# Patient Record
Sex: FEMALE | Race: WHITE
[De-identification: names, ages, dates, MRNs, and addresses within clinical notes are randomized per-mention and may not be internally consistent; named-entity substitution may affect disease eponyms.]

---

## 2017-04-10 ENCOUNTER — HOSPITAL ENCOUNTER (INPATIENT)
Dept: HOSPITAL 53 - M ED | Age: 22
LOS: 4 days | Discharge: HOME | DRG: 881 | End: 2017-04-14
Attending: PSYCHIATRY & NEUROLOGY | Admitting: PSYCHIATRY & NEUROLOGY
Payer: MEDICAID

## 2017-04-10 VITALS — WEIGHT: 293 LBS | HEIGHT: 65 IN | BODY MASS INDEX: 48.82 KG/M2

## 2017-04-10 VITALS — DIASTOLIC BLOOD PRESSURE: 67 MMHG | SYSTOLIC BLOOD PRESSURE: 131 MMHG

## 2017-04-10 DIAGNOSIS — Z62.810: ICD-10-CM

## 2017-04-10 DIAGNOSIS — F60.3: ICD-10-CM

## 2017-04-10 DIAGNOSIS — E28.2: ICD-10-CM

## 2017-04-10 DIAGNOSIS — F43.10: ICD-10-CM

## 2017-04-10 DIAGNOSIS — Z62.811: ICD-10-CM

## 2017-04-10 DIAGNOSIS — F32.9: Primary | ICD-10-CM

## 2017-04-10 DIAGNOSIS — Z91.5: ICD-10-CM

## 2017-04-10 DIAGNOSIS — Z81.8: ICD-10-CM

## 2017-04-10 DIAGNOSIS — F17.210: ICD-10-CM

## 2017-04-10 DIAGNOSIS — Z91.040: ICD-10-CM

## 2017-04-10 DIAGNOSIS — M54.5: ICD-10-CM

## 2017-04-10 LAB
ALBUMIN SERPL BCG-MCNC: 4 GM/DL (ref 3.2–5.2)
ALBUMIN/GLOB SERPL: 1.18 {RATIO} (ref 1–1.93)
ALP SERPL-CCNC: 114 U/L (ref 45–117)
ALT SERPL W P-5'-P-CCNC: 23 U/L (ref 12–78)
ANION GAP SERPL CALC-SCNC: 8 MEQ/L (ref 8–16)
AST SERPL-CCNC: 19 U/L (ref 15–37)
BILIRUB CONJ SERPL-MCNC: 0.1 MG/DL (ref 0–0.2)
BILIRUB SERPL-MCNC: 0.3 MG/DL (ref 0.2–1)
BUN SERPL-MCNC: 13 MG/DL (ref 7–18)
CALCIUM SERPL-MCNC: 8.8 MG/DL (ref 8.5–10.1)
CHLORIDE SERPL-SCNC: 106 MEQ/L (ref 98–107)
CO2 SERPL-SCNC: 27 MEQ/L (ref 21–32)
CONTROL LINE HCG: (no result)
CREAT SERPL-MCNC: 0.98 MG/DL (ref 0.55–1.02)
ERYTHROCYTE [DISTWIDTH] IN BLOOD BY AUTOMATED COUNT: 13.1 % (ref 11.5–14.5)
GLUCOSE SERPL-MCNC: 90 MG/DL (ref 70–105)
MCH RBC QN AUTO: 30.4 PG (ref 27–33)
MCHC RBC AUTO-ENTMCNC: 33 G/DL (ref 32–36.5)
MCV RBC AUTO: 92 FL (ref 80–96)
METHADONE UR QL SCN: NEGATIVE
PLATELET # BLD AUTO: 319 K/MM3 (ref 150–450)
POTASSIUM SERPL-SCNC: 3.7 MEQ/L (ref 3.5–5.1)
PROT SERPL-MCNC: 7.4 GM/DL (ref 6.4–8.2)
SODIUM SERPL-SCNC: 141 MEQ/L (ref 136–145)
WBC # BLD AUTO: 9 K/MM3 (ref 4–10)

## 2017-04-11 VITALS — DIASTOLIC BLOOD PRESSURE: 74 MMHG | SYSTOLIC BLOOD PRESSURE: 110 MMHG

## 2017-04-11 VITALS — DIASTOLIC BLOOD PRESSURE: 74 MMHG | SYSTOLIC BLOOD PRESSURE: 123 MMHG

## 2017-04-11 RX ADMIN — NICOTINE SCH PATCH: 21 PATCH, EXTENDED RELEASE TRANSDERMAL at 09:00
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
## 2017-04-12 VITALS — SYSTOLIC BLOOD PRESSURE: 139 MMHG | DIASTOLIC BLOOD PRESSURE: 79 MMHG

## 2017-04-12 RX ADMIN — NICOTINE SCH PATCH: 21 PATCH, EXTENDED RELEASE TRANSDERMAL at 08:13

## 2017-04-12 NOTE — IPNPDOC
[facility] Progress Note


Progress Note


DATE OF SERVICE: 4/12/17





INTERVAL HISTORY:





Medication Side effects: None reported


Behavior: She is being interacting with other patients and staff, compliant 

with medications and attendance to groups, she hasn't been disruptive neither 

aggressive.


Group Attendance: Has been attending meditation and yoga groups


Psychiatric Symptom change: Her mood and affect have improved





VITAL SIGNS: See below.





NEW TEST RESULTS: See below





CURRENT MEDICATIONS: See below.





MENTAL STATUS EXAMINATION:





General: Dressed in personal clothes, good eye contact, good rapport


Speech: Normal


Thought processes:Linear, coherent


Thought content: Occasional suicidal ideation ,negative for delusional thoughts

, negative for auditory or visual hallucinations, negative for obsessions


Abstract reasoning, and computation: Fair


Description of associations:No loosening of associations


Description of abnormal or psychotic thoughts: No delusional thoughts, no 

hallucinations no response to internal stimuli and no thought insertion


Judgment: Improving


Insight: Improving


Orientation: Oriented 3


Recent and remote memory: Intact


Attention span and concentration: Fair


Fund of knowledge: Fair


Mood: "I feel much better, I couldn't sleep last night"


Affect: Less depressed and anxious





DIAGNOSES:


1. Major depressive disorder, chronic, severe with suicidal ideation.


2. PTSD.


3. Borderline personality disorder.


 


ASSESSMENT: The patient was in a brighter mood today, and she said that her 

sleep had improved and was happy about that because she is aware that the lack 

of sleep makes her irritable and depressed. She feels that she is doing better 

now that she has started taking her medications. She is goal oriented and she 

wants to get better.





MANAGEMENT PLAN: 





Medications: Prozac 20 mg by mouth daily and trazodone 50 mg by mouth when 

necessary daily at bedtime





Psychotherapy: She is attending groups and receiving psychotherapy





Social: Interacting with staff and other patients, once she gets stabilized she 

will be discharged and we have will be able to go back to her family.





Misc: Motivated and engaged in her treatment





Disposition: Inpatient mental health unit while she becomes stable





TIME SPENT: 15 minutes.





Vital Signs





 Vital Signs








  Date Time  Temp Pulse Resp B/P Pulse Ox O2 Delivery O2 Flow Rate FiO2


 


4/12/17 06:42 98.1 48 20 139/79    


 


4/10/17 21:05      Room Air  


 


4/10/17 21:05     98   











Current Medications





 Current Medications


Acetaminophen (Tylenol Tab) 650 mg Q6HP  PRN PO HEADACHE or DISCOMFORT;  Start 4

/10/17 at 23:45;  Stop 5/10/17 at 23:44


Al Hydrox/Mg Hydrox/Simethicone (Mylanta) 30 ml Q4HP  PRN PO HEARTBURN/

INDIGESTION;  Start 4/10/17 at 23:45;  Stop 5/10/17 at 23:44


Fluoxetine HCl (PROzac) 20 mg QAM PO  Last administered on 4/12/17at 08:15;  

Start 4/12/17 at 09:00;  Stop 5/12/17 at 08:59


Home Med (Med Rec Complete!)  ASDIRECTED XX ;  Start 4/10/17 at 20:00;  Stop 4/

10/17 at 20:00;  Status DC


Magnesium Hydroxide (Milk Of Magnesia) 30 ml DAILYPRN  PRN PO CONSTIPATION;  

Start 4/10/17 at 23:45;  Stop 5/10/17 at 23:44


Nicotine (Nicoderm Cq 21mg) 1 patch DAILY TD ;  Start 4/11/17 at 09:00;  Stop 5/ 11/17 at 08:59


Trazodone HCl (Desyrel) 50 mg QHS PO  Last administered on 4/11/17at 21:22;  

Start 4/11/17 at 21:00;  Stop 5/11/17 at 20:59





Allergies


Coded Allergies:  


     Latex (Verified  Allergy, Unknown, 4/10/17)








NADER BURGOS MD Apr 12, 2017 19:53

## 2017-04-12 NOTE — HPEPDOC
Medical History and Physical


Date of Admission


Apr 10, 2017 at 19:16





History and Physical





PCP: [facility]


ATTENDING: Dr. Cesar Candelaria





HPI: 21yoF admitted to [facility] for unspecified depressive disorder, being 

medically examined today. No acute medical complaints today. Denies any fevers, 

chills, weakness, fatigue, HA, CP, SOB, cough, palpitations, abdominal pain, N/V

/D or changes in bowel or bladder habits.





PMHx: 


Depression


ADD


Chronic back pain/degenerative disc disease


PCOS


BMI 58.3





PSHX:


Denies





SOCHX:


Resides in: [place]


Marital Status: Single


Kids: None


Employment: Unemployed


Tobacco use: One half pack per day


ETOH: One bottle of wine every 8 months


Illicit Drugs: Marijuana 1 or 2 times


IV Drug Use: Denies


Tattoos done unprofessionally: Denies 





FAMHX:


Mother: Alive, history of depression, bipolar disorder, PCOS


Father: Alive, bipolar, depression


Siblings: One sister, half brother Alive, bipolar disorder, PTSD, OCD, ADD


Children: None


Unexpected deaths due to medical reasons: None.





ROS:


As noted in HPI, otherwise 11pt ROS of systems reviewed and remarkable only for 

LMP 4/5/17


                 


PE:      


GEN: 20 yo F, appears stated age. Well-nourished, well developed. No acute 

distress. Alert and oriented x 3. Pleasant, interactive. 


HEENT: Normocephalic, atraumatic. Pupils are equal, round, and reactive to 

light. Extraocular movements are intact. No nystagmus appreciated. Sclera are 

nonicteric. Conjunctiva without injection. Nose midline. Nasal turbinates 

without bogginess. EACs both patent BL. TMs both visualized and gray with good 

cone of light, no bulging or erythema. No facial asymmetry. Moist mucous 

membranes. Dentition fair. Pharynx pink and moist, no cobblestoning. Neck supple

, trachea midline. No lymphadenopathy or thyromegaly appreciated. 


CHEST: Regular rate and rhythm, +S1, +S2


LUNGS: Clear to auscultation bilaterally. No wheezes, rales, or rhonchi. 

Breathing appears symmetric and easy. Patient is speaking in full sentences. No 

accessory muscle use.


ABD: Round, soft, non-tender, non-distended. +Bowel sounds throughout. No 

rebound or guarding. No costovertebral angle tenderness.


EXT: Pulses 2+ bilaterally dorsalis pedis and radial. No lower extremity edema 

appreciated.


SKIN: Pink, dry, warm. Capillary refill <2sec. No rashes.


NEURO: Alert and oriented x 3. Cranial nerves III-XII are intact. No focal 

deficits appreciated. 





EKG: Pending.








A&P:  21yoF admitted to [facility] for unspecified depressive disorder


1. Psych. Plan per Psychiatry. Obtain baseline EKG to assure the safety of 

psychiatric medications as they can prolong the QT interval.


2. Nicotine dependence. Patch available.


3. Chronic low back pain. Tylenol as needed.


4. Follow up with PCP on discharge.


5. Staff member Noy NOE present throughout exam.





Vital Signs





 Vital Signs








  Date Time  Temp Pulse Resp B/P Pulse Ox O2 Delivery O2 Flow Rate FiO2


 


4/12/17 06:42 98.1 48 20 139/79    


 


4/10/17 21:05      Room Air  


 


4/10/17 21:05     98   











Laboratory Data


Labs 24H











Item Value  Date Time


 


White Blood Count 9.0 K/mm3 4/10/17 1601


 


Red Blood Count 4.47 M/mm3 4/10/17 1601


 


Hemoglobin 13.6 g/dl 4/10/17 1601


 


Hematocrit 41.1 % 4/10/17 1601


 


Mean Corpuscular Volume 92.0 fl 4/10/17 1601


 


Mean Corpuscular Hemoglobin 30.4 pg 4/10/17 1601


 


Mean Corpuscular Hemoglobin Concent 33.0 g/dl 4/10/17 1601


 


Red Cell Distribution Width 13.1 % 4/10/17 1601


 


Platelet Count 319 k/mm3 4/10/17 1601


 


Sodium Level 141 MEQ/L 4/10/17 1601


 


Potassium Level 3.7 MEQ/L 4/10/17 1601


 


Chloride Level 106 MEQ/L 4/10/17 1601


 


Carbon Dioxide Level 27 MEQ/L 4/10/17 1601


 


Anion Gap 8 MEQ/L 4/10/17 1601


 


Blood Urea Nitrogen 13 MG/DL 4/10/17 1601


 


Creatinine 0.98 MG/DL 4/10/17 1601


 


Fasting Glucose 90 MG/DL 4/10/17 1601


 


Calcium Level 8.8 MG/DL 4/10/17 1601


 


Total Bilirubin 0.3 MG/DL 4/10/17 1601


 


Direct Bilirubin 0.1 MG/DL 4/10/17 1601


 


Aspartate Amino Transf (AST/SGOT) 19 U/L 4/10/17 1601


 


Alanine Aminotransferase (ALT/SGPT) 23 U/L 4/10/17 1601


 


Alkaline Phosphatase 114 U/L 4/10/17 1601


 


Total Protein 7.4 GM/DL 4/10/17 1601


 


Albumin 4.0 GM/DL 4/10/17 1601


 


Albumin/Globulin Ratio 1.18 4/10/17 1601


 


Thyroid Stimulating Hormone (TSH) 2.590 uIU/ML 4/10/17 1601


 


Human Chorionic Gonadotropin, Qual NEGATIVE 4/10/17 1601


 


Salicylates Level 2.3 MG/DL L 4/10/17 1601


 


Urine Opiates Screen NEGATIVE 4/10/17 1601


 


Urine Methadone Screen NEGATIVE 4/10/17 1601


 


Acetaminophen Level < 2.0 UG/ML L 4/10/17 1601


 


Urine Barbiturates Screen NEGATIVE 4/10/17 1601


 


Urine Phencyclidine Screen NEGATIVE 4/10/17 1601


 


Urine Amphetamines Screen NEGATIVE 4/10/17 1601


 


Urine Benzodiazepines Screen NEGATIVE 4/10/17 1601


 


Urine Cocaine Metabolite Screen NEGATIVE 4/10/17 1601


 


Urine Cannabinoids Screen POSITIVE H 4/10/17 1601


 


Ethyl Alcohol Level < 0.003 % 4/10/17 1601











Home Medications


No Active Prescriptions or Reported Meds





Allergies


Coded Allergies:  


     Latex (Verified  Allergy, Unknown, 4/10/17)








Jennifer Langley Apr 12, 2017 10:54

## 2017-04-13 VITALS — SYSTOLIC BLOOD PRESSURE: 146 MMHG | DIASTOLIC BLOOD PRESSURE: 88 MMHG

## 2017-04-13 VITALS — SYSTOLIC BLOOD PRESSURE: 134 MMHG | DIASTOLIC BLOOD PRESSURE: 76 MMHG

## 2017-04-13 RX ADMIN — NICOTINE SCH PATCH: 21 PATCH, EXTENDED RELEASE TRANSDERMAL at 08:07

## 2017-04-13 NOTE — ECGEPIP
Stationary ECG Study

                              [facility]

                                       

                                       Test Date:    2017

Pat Name:     SALLY SANDS              Department:   

Patient ID:   [id number]                 Room:         [place] [address]

Gender:       F                        Technician:   SHABBIR

:          1995               Requested By: Jennifer Langley 

Order Number: [id number]     Reading MD:   Fredis Arguello

                                 Measurements

Intervals                              Axis          

Rate:         60                       P:            23

TX:           137                      QRS:          61

QRSD:         98                       T:            36

QT:           389                                    

QTc:          390                                    

                           Interpretive Statements

SINUS RHYTHM

Normal

Electronically Signed On 2017 12:44:28 EDT by Fredis Arguello

## 2017-04-13 NOTE — IPNPDOC
[facility] Progress Note


Progress Note


INTERVAL HISTORY:





Medication Side effects: No negative side effects have been reported


Behavior: Good attitude, cooperative, friendly, sociable


Group Attendance: Has been attending groups regularly


Psychiatric Symptom change: Her mood and affect are brighter, less depressed. 

She has been able to sleep 8-9 hours.





VITAL SIGNS: See below.





NEW TEST RESULTS: See below





CURRENT MEDICATIONS: See below.





MENTAL STATUS EXAMINATION:





General: Alert, cooperative, good eye contact, good rapport, good hygiene


Speech: Normal, not circumstantial, no tangential


Thought processes: Normal, linear, coherent


Thought content: Negative for suicidal ideation, homicidal ideation, delusional 

thoughts, hallucinations, obsessions


Abstract reasoning, and computation: Fair


Description of associations: No loosening of associations


Description of abnormal or psychotic thoughts: Absent


Judgment: Improving


Insight: Improving


Orientation: Oriented 3


Recent and remote memory: Intact


Attention span and concentration: Fair


Fund of knowledge: Fair


Mood: I feel much better and I was able to sleep


Affect: Less depressed and anxious





DIAGNOSES:


1. Major depressive disorder, moderate without suicidal ideation.


2. Posttraumatic stress disorder.


3. And borderline personality.


 


ASSESSMENT: The patient is motivated and she has improved since her admission. 

She is goal oriented and has plans for the future. She will be able to be 

discharged home by the end of the week.





MANAGEMENT PLAN: 





Medications: Prozac 20 mg by mouth every morning and trazodone 50 mg by mouth 

daily at bedtime





Psychotherapy: Accidents groups regularly





Social: She will live with her family and will be in touch with friends.





Misc: None





Disposition: Patient will be discharged home to her family if they're able to 

have a meeting with the discharge planners and they have. Patient is stable, 

not suicidal, not psychotic, not homicidal.





TIME SPENT: 15 minutes.DATE OF SERVICE: 4/13/1





VITAL SIGNS: See below.





Vital Signs





 Vital Signs








  Date Time  Temp Pulse Resp B/P Pulse Ox O2 Delivery O2 Flow Rate FiO2


 


4/13/17 06:32 98.2 98 18 134/76  Room Air  


 


4/10/17 21:05     98   











Current Medications





 Current Medications


Acetaminophen (Tylenol Tab) 650 mg Q6HP  PRN PO HEADACHE or DISCOMFORT;  Start 4

/10/17 at 23:45;  Stop 5/10/17 at 23:44


Al Hydrox/Mg Hydrox/Simethicone (Mylanta) 30 ml Q4HP  PRN PO HEARTBURN/

INDIGESTION;  Start 4/10/17 at 23:45;  Stop 5/10/17 at 23:44


Fluoxetine HCl (PROzac) 20 mg QAM PO  Last administered on 4/13/17at 08:07;  

Start 4/12/17 at 09:00;  Stop 5/12/17 at 08:59


Home Med (Med Rec Complete!)  ASDIRECTED XX ;  Start 4/10/17 at 20:00;  Stop 4/

10/17 at 20:00;  Status DC


Magnesium Hydroxide (Milk Of Magnesia) 30 ml DAILYPRN  PRN PO CONSTIPATION;  

Start 4/10/17 at 23:45;  Stop 5/10/17 at 23:44


Nicotine (Nicoderm Cq 21mg) 1 patch DAILY TD ;  Start 4/11/17 at 09:00;  Stop 5/ 11/17 at 08:59


Trazodone HCl (Desyrel) 50 mg QHS PO  Last administered on 4/12/17at 21:38;  

Start 4/11/17 at 21:00;  Stop 5/11/17 at 20:59





Allergies


Coded Allergies:  


     Latex (Verified  Allergy, Unknown, 4/10/17)








NADER BURGOS MD Apr 13, 2017 16:49

## 2017-04-14 VITALS — DIASTOLIC BLOOD PRESSURE: 69 MMHG | SYSTOLIC BLOOD PRESSURE: 134 MMHG

## 2017-04-14 RX ADMIN — NICOTINE SCH PATCH: 21 PATCH, EXTENDED RELEASE TRANSDERMAL at 08:07

## 2017-04-14 NOTE — DS.PDOC
[facility] Discharge Summary


Discharge Summary


DATE OF ADMISSION: Apr 10, 2017 at 19:16 


DATE OF DISCHARGE: 





DISCHARGE DIAGNOSES:


1. Major depressive disorder unspecified, without suicidal ideation


2. PTSD


3. Borderline personality disorder.





REASON FOR ADMISSION: The patient states that she has been out of medication 

for 4 months and that she felt increasingly depressed, unable to sleep well, sad

, tearful, with low levels of energy, lack of interest, anhedonia difficulties 

concentrating, psychomotor retardation and suicidal ideation. She was at risk 

for suicide, she has tried to kill herself before in the last time she tried to 

jump off a bridge, she stated this was the thought that was on her mind, 

jumping off a bridge if she continued to feel very depressed..





CONSULTANTS INVOLVED: None





TREATMENT AND PROGRESS ON THE UNIT :, Has been treated with 20 mg of fluoxetine 

every day since she was admitted for depression and she has been taking 

trazodone 50 mg at bedtime for insomnia. She has improved recently on those 

medications and now she states that she can sleep better and feels better, 

because she feels calmer, has more energy, has more interest in mingling with 

people, socializing and she feels that her mind is getting clear and she is 

able to focus and concentrate better. She has denies suicidal ideation and has 

denied having thoughts of hurting herself in any other possible weight, like 

cutting.





HOSPITAL COURSE: She has responded to medications fairly well, she has attended 

groups especially the medication and a yoga group but she also has attended 

process groups. She is very interested and motivated in getting better and 

overcoming her psychiatric problems. She states that she wants to go back to 

school, she is goal oriented and has plans for her future. She has been seen in 

a brighter mood, she is more sociable and she is able to verbalize quite 

clearly her emotions. 





DISCHARGE ASSESSMENT: She is stable upon discharge, she is not at risk for 

suicide or harming herself in any other possible way. She has no delusions, no 

hallucinations no obsessions and no aggressiveness towards others. 





MENTAL STATUS EXAMINATION ON DISCHARGE: 


Patient is a 21-year old female, who is alert, cooperative with interview, with 

good eye contact and will report.


Speech is normal.


Language skills are fair.


Thought processes including: Linear, coherent, logical. 


Thought content: Negative for suicidal ideation, negative for homicidal ideation

, negative for delusional thoughts, hallucinations and obsessions. She doesn't 

have a suicidal plan not homicidal at the time of the discharge.


Abstract reasoning, and computation: Fairly good.


Description of associations: There is no loosening of associations.


Description of abnormal or psychotic thoughts: There is no psychotic thoughts 

present.


Judgment: Improved.


Insight: Improved.


Orientation to oriented 3.


Recent and remote memory: Intact.


Attention span and concentration: Good.


Language: Fluid, well structured with fairly good vocabulary.


Fund of knowledge: Good.


Mood: "I feel much better, I've been able to sleep and I feel that I'm less 

depressed".


Affect: Brighter.





MEDICATIONS ON DISCHARGE:


-Prozac 20 mg for depression and anxiety.


-Trazodone 50 mg for insomnia








PLAN/FOLLOWUP ARRANGEMENTS: The patient will go back to her family where she 

will be taken to an outpatient treatment center where she will continue to 

receive psychotherapy and psychiatric medications.





The amount of time spent in the coordination of care for this patient was 

approximately 40 minutes





Vital Signs/I&Os





 Vital Signs








  Date Time  Temp Pulse Resp B/P Pulse Ox O2 Delivery O2 Flow Rate FiO2


 


4/14/17 05:55 97.2 70 18 134/69  Room Air  


 


4/10/17 21:05     98   











Medications


Scheduled


Fluoxetine Hcl (Fluoxetine HCl) 20 Mg Cap #10 20 MG PO QAM MOOD 


Trazodone HCl (Trazodone HCl) 50 Mg Tab #10 50 MG PO QHS ANXIETY/AGITATION 





Allergies


Coded Allergies:  


     Latex (Verified  Allergy, Unknown, 4/10/17)








NADER BURGOS MD Apr 14, 2017 09:47

## 2018-02-02 ENCOUNTER — HOSPITAL ENCOUNTER (OUTPATIENT)
Dept: HOSPITAL 53 - M SMT | Age: 23
End: 2018-02-02
Payer: COMMERCIAL

## 2018-02-02 DIAGNOSIS — Z34.82: Primary | ICD-10-CM

## 2018-02-02 LAB
BASO #: 0.1 10^3/UL (ref 0–0.2)
BASO %: 0.5 % (ref 0–1)
CHLAMYDIA DNA AMPLIFICATION: NEGATIVE
EOS #: 0.1 10^3/UL (ref 0–0.5)
EOSINOPHIL NFR BLD AUTO: 1.2 % (ref 0–3)
GC DNA AMPLIFICATION: NEGATIVE
HBSAG PRENATAL: NEGATIVE
HCV AB SER QL: < 0 INDEX (ref ?–0.8)
HEMATOCRIT: 42.4 % (ref 36–47)
HEMOGLOBIN: 14.3 G/DL (ref 12–16)
HIV 1&2 SCREEN CENTAUR: NEGATIVE
IMMATURE GRANULOCYTE #: 0.1 10^3/UL (ref 0–0)
IMMATURE GRANULOCYTE %: 0.4 % (ref 0–0)
LYMPH #: 2.4 10^3/UL (ref 1.5–6.5)
LYMPH %: 19.7 % (ref 24–44)
MEAN CORPUSCULAR HEMOGLOBIN: 29.9 PG (ref 27–33)
MEAN CORPUSCULAR HGB CONC: 33.7 G/DL (ref 32–36.5)
MEAN CORPUSCULAR VOLUME: 88.7 FL (ref 80–96)
MONO #: 0.8 10^3/UL (ref 0–0.8)
MONO %: 6.6 % (ref 0–5)
NEUTROPHILS #: 8.6 10^3/UL (ref 1.8–7.7)
NEUTROPHILS %: 71.6 % (ref 36–66)
NRBC BLD AUTO-RTO: 0 % (ref 0–0)
PLATELET COUNT, AUTOMATED: 338 10^3/UL (ref 150–450)
RED BLOOD COUNT: 4.78 10^6/UL (ref 4–5.4)
RED CELL DISTRIBUTION WIDTH: 13.4 % (ref 11.5–14.5)
RUBELLA IGG QUALITATIVE: (no result)
SYPHILIS: NONREACTIVE
WHITE BLOOD COUNT: 12 10^3/UL (ref 4–10)

## 2018-02-02 PROCEDURE — 86762 RUBELLA ANTIBODY: CPT

## 2018-03-13 ENCOUNTER — HOSPITAL ENCOUNTER (OUTPATIENT)
Dept: HOSPITAL 53 - M SMT | Age: 23
End: 2018-03-13
Attending: ADVANCED PRACTICE MIDWIFE
Payer: MEDICAID

## 2018-03-13 DIAGNOSIS — R53.83: Primary | ICD-10-CM

## 2018-03-13 LAB
ALBUMIN/GLOBULIN RATIO: 0.94 (ref 1–1.93)
ALBUMIN: 3.1 GM/DL (ref 3.2–5.2)
ALKALINE PHOSPHATASE: 98 U/L (ref 45–117)
ALT SERPL W P-5'-P-CCNC: 61 U/L (ref 12–78)
ANION GAP: 9 MEQ/L (ref 8–16)
AST SERPL-CCNC: 31 U/L (ref 7–37)
BILIRUBIN,TOTAL: 0.2 MG/DL (ref 0.2–1)
BLOOD UREA NITROGEN: 8 MG/DL (ref 7–18)
CALCIUM LEVEL: 8.3 MG/DL (ref 8.5–10.1)
CARBON DIOXIDE LEVEL: 25 MEQ/L (ref 21–32)
CHLAMYDIA DNA AMPLIFICATION: NEGATIVE
CHLORIDE LEVEL: 107 MEQ/L (ref 98–107)
CREATININE FOR GFR: 0.63 MG/DL (ref 0.55–1.3)
FT4I SERPL CALC-MCNC: 4.2 % (ref 1.3–4.8)
GC DNA AMPLIFICATION: NEGATIVE
GLUCOSE, FASTING: 68 MG/DL (ref 70–100)
HEMATOCRIT: 37.3 % (ref 36–47)
HEMOGLOBIN: 12.4 G/DL (ref 12–16)
MEAN CORPUSCULAR HEMOGLOBIN: 30 PG (ref 27–33)
MEAN CORPUSCULAR HGB CONC: 33.2 G/DL (ref 32–36.5)
MEAN CORPUSCULAR VOLUME: 90.1 FL (ref 80–96)
NRBC BLD AUTO-RTO: 0 % (ref 0–0)
PLATELET COUNT, AUTOMATED: 323 10^3/UL (ref 150–450)
POTASSIUM SERUM: 3.9 MEQ/L (ref 3.5–5.1)
RED BLOOD COUNT: 4.14 10^6/UL (ref 4–5.4)
RED CELL DISTRIBUTION WIDTH: 14.2 % (ref 11.5–14.5)
SODIUM LEVEL: 141 MEQ/L (ref 136–145)
T UPTAKE: 25 % (ref 30–39)
THYROID STIMULATING HORMONE: 1.54 UIU/ML (ref 0.46–3.98)
THYROXINE (T4): 16.6 UG/DL (ref 6–11.6)
TOTAL PROTEIN: 6.4 GM/DL (ref 6.4–8.2)
WHITE BLOOD COUNT: 9.9 10^3/UL (ref 4–10)

## 2018-03-13 PROCEDURE — 84443 ASSAY THYROID STIM HORMONE: CPT

## 2018-05-05 ENCOUNTER — HOSPITAL ENCOUNTER (OUTPATIENT)
Dept: HOSPITAL 53 - M LDO | Age: 23
Discharge: HOME | End: 2018-05-05
Attending: ADVANCED PRACTICE MIDWIFE
Payer: COMMERCIAL

## 2018-05-05 DIAGNOSIS — O47.03: Primary | ICD-10-CM

## 2018-05-05 DIAGNOSIS — Z3A.27: ICD-10-CM

## 2018-05-05 PROCEDURE — 59025 FETAL NON-STRESS TEST: CPT

## 2018-05-15 ENCOUNTER — HOSPITAL ENCOUNTER (OUTPATIENT)
Dept: HOSPITAL 53 - M SMT | Age: 23
End: 2018-05-15
Attending: ADVANCED PRACTICE MIDWIFE
Payer: COMMERCIAL

## 2018-05-15 DIAGNOSIS — Z34.82: Primary | ICD-10-CM

## 2018-05-15 LAB
GLUCOSE CHALLENGE TEST 1 HOUR: 96 MG/DL (ref ?–140)
HEMATOCRIT: 34.8 % (ref 36–47)
HEMOGLOBIN: 11.3 G/DL (ref 12–15.5)
MEAN CORPUSCULAR HEMOGLOBIN: 30.4 PG (ref 27–33)
MEAN CORPUSCULAR HGB CONC: 32.5 G/DL (ref 32–36.5)
MEAN CORPUSCULAR VOLUME: 93.5 FL (ref 80–96)
NRBC BLD AUTO-RTO: 0 % (ref 0–0)
PLATELET COUNT, AUTOMATED: 302 10^3/UL (ref 150–450)
RED BLOOD COUNT: 3.72 10^6/UL (ref 4–5.4)
RED CELL DISTRIBUTION WIDTH: 13.6 % (ref 11.5–14.5)
WHITE BLOOD COUNT: 14.5 10^3/UL (ref 4–10)

## 2018-05-15 PROCEDURE — 82950 GLUCOSE TEST: CPT

## 2018-05-23 ENCOUNTER — HOSPITAL ENCOUNTER (OUTPATIENT)
Dept: HOSPITAL 53 - M LDO | Age: 23
Discharge: HOME | End: 2018-05-23
Attending: OBSTETRICS & GYNECOLOGY
Payer: COMMERCIAL

## 2018-05-23 DIAGNOSIS — Z3A.30: ICD-10-CM

## 2018-05-23 DIAGNOSIS — O47.03: Primary | ICD-10-CM

## 2018-05-23 PROCEDURE — 76815 OB US LIMITED FETUS(S): CPT

## 2018-07-24 ENCOUNTER — HOSPITAL ENCOUNTER (OUTPATIENT)
Dept: HOSPITAL 53 - M LAB REF | Age: 23
End: 2018-07-24
Payer: COMMERCIAL

## 2018-07-24 DIAGNOSIS — Z34.83: Primary | ICD-10-CM

## 2018-07-24 DIAGNOSIS — Z3A.00: ICD-10-CM

## 2018-07-24 PROCEDURE — 87081 CULTURE SCREEN ONLY: CPT

## 2018-08-11 ENCOUNTER — HOSPITAL ENCOUNTER (INPATIENT)
Dept: HOSPITAL 53 - M LDO | Age: 23
LOS: 3 days | Discharge: HOME | DRG: 560 | End: 2018-08-14
Payer: COMMERCIAL

## 2018-08-11 DIAGNOSIS — F90.9: ICD-10-CM

## 2018-08-11 DIAGNOSIS — F17.210: ICD-10-CM

## 2018-08-11 DIAGNOSIS — F32.9: ICD-10-CM

## 2018-08-11 DIAGNOSIS — F43.10: ICD-10-CM

## 2018-08-11 DIAGNOSIS — Z91.040: ICD-10-CM

## 2018-08-11 DIAGNOSIS — O48.0: Primary | ICD-10-CM

## 2018-08-11 DIAGNOSIS — F42.9: ICD-10-CM

## 2018-08-11 DIAGNOSIS — F41.9: ICD-10-CM

## 2018-08-11 DIAGNOSIS — Z3A.41: ICD-10-CM

## 2018-08-11 DIAGNOSIS — F52.8: ICD-10-CM

## 2018-08-11 LAB
AMPHETAMINES UR QL SCN: NEGATIVE
BARBITURATES UR QL SCN: NEGATIVE
BENZODIAZ UR QL SCN: NEGATIVE
BZE UR QL SCN: NEGATIVE
CANNABINOIDS UR QL SCN: NEGATIVE
HEMATOCRIT: 34.4 % (ref 36–47)
HEMOGLOBIN: 11.5 G/DL (ref 12–15.5)
MEAN CORPUSCULAR HEMOGLOBIN: 29.4 PG (ref 27–33)
MEAN CORPUSCULAR HGB CONC: 33.4 G/DL (ref 32–36.5)
MEAN CORPUSCULAR VOLUME: 88 FL (ref 80–96)
METHADONE URINE REFLEX: NEGATIVE
NRBC BLD AUTO-RTO: 0 % (ref 0–0)
OPIATES UR QL SCN: NEGATIVE
PCP UR QL SCN: NEGATIVE
PLATELET COUNT, AUTOMATED: 302 10^3/UL (ref 150–450)
RED BLOOD COUNT: 3.91 10^6/UL (ref 4–5.4)
RED CELL DISTRIBUTION WIDTH: 14.6 % (ref 11.5–14.5)
WHITE BLOOD COUNT: 11 10^3/UL (ref 4–10)

## 2018-08-11 RX ADMIN — SODIUM CHLORIDE, POTASSIUM CHLORIDE, SODIUM LACTATE AND CALCIUM CHLORIDE 1 MLS/HR: 600; 310; 30; 20 INJECTION, SOLUTION INTRAVENOUS at 20:05

## 2018-08-11 RX ADMIN — Medication 1 MLS/HR: at 21:17

## 2018-08-12 PROCEDURE — 0KQM0ZZ REPAIR PERINEUM MUSCLE, OPEN APPROACH: ICD-10-PCS

## 2018-08-12 RX ADMIN — SODIUM CHLORIDE, POTASSIUM CHLORIDE, SODIUM LACTATE AND CALCIUM CHLORIDE 1 MLS/HR: 600; 310; 30; 20 INJECTION, SOLUTION INTRAVENOUS at 10:30

## 2018-08-12 RX ADMIN — BUTORPHANOL TARTRATE 1 MG: 2 INJECTION, SOLUTION INTRAMUSCULAR; INTRAVENOUS at 01:06

## 2018-08-12 RX ADMIN — PROMETHAZINE HYDROCHLORIDE 1 MG: 25 INJECTION INTRAMUSCULAR; INTRAVENOUS at 01:07

## 2018-08-12 RX ADMIN — LIDOCAINE HYDROCHLORIDE 1 ML: 10 INJECTION, SOLUTION INFILTRATION; PERINEURAL at 12:00

## 2018-08-12 RX ADMIN — Medication 1 MLS/HR: at 12:00

## 2018-08-13 LAB — SYPHILIS: NONREACTIVE

## 2018-08-13 RX ADMIN — IBUPROFEN 1 MG: 800 TABLET, FILM COATED ORAL at 04:14

## 2018-08-13 RX ADMIN — Medication 1 TAB: at 07:28

## 2018-08-13 RX ADMIN — HUMAN RHO(D) IMMUNE GLOBULIN 1 MCG: 300 INJECTION, SOLUTION INTRAMUSCULAR at 10:21

## 2018-08-13 RX ADMIN — MEASLES, MUMPS, AND RUBELLA VIRUS VACCINE LIVE 1 ML: 1000; 12500; 1000 INJECTION, POWDER, LYOPHILIZED, FOR SUSPENSION SUBCUTANEOUS at 10:21

## 2018-08-14 RX ADMIN — Medication 1 TAB: at 08:15

## 2018-08-16 ENCOUNTER — HOSPITAL ENCOUNTER (EMERGENCY)
Dept: HOSPITAL 53 - M ED | Age: 23
LOS: 1 days | Discharge: HOME | End: 2018-08-17
Payer: COMMERCIAL

## 2018-08-16 DIAGNOSIS — K21.9: ICD-10-CM

## 2018-08-16 DIAGNOSIS — F31.9: ICD-10-CM

## 2018-08-16 DIAGNOSIS — F90.9: ICD-10-CM

## 2018-08-16 DIAGNOSIS — F52.8: ICD-10-CM

## 2018-08-16 DIAGNOSIS — Z91.018: ICD-10-CM

## 2018-08-16 DIAGNOSIS — F17.200: ICD-10-CM

## 2018-08-16 DIAGNOSIS — Z91.040: ICD-10-CM

## 2018-08-16 DIAGNOSIS — F42.9: ICD-10-CM

## 2018-08-16 LAB
ALT SERPL W P-5'-P-CCNC: 29 U/L (ref 12–78)
ANION GAP: 8 MEQ/L (ref 8–16)
AST SERPL-CCNC: 26 U/L (ref 7–37)
BASO #: 0.1 10^3/UL (ref 0–0.2)
BASO %: 0.3 % (ref 0–1)
BILIRUBIN,TOTAL: 0.2 MG/DL (ref 0.2–1)
BLOOD UREA NITROGEN: 12 MG/DL (ref 7–18)
CALCIUM LEVEL: 8.4 MG/DL (ref 8.5–10.1)
CARBON DIOXIDE LEVEL: 27 MEQ/L (ref 21–32)
CHLORIDE LEVEL: 110 MEQ/L (ref 98–107)
CREATININE FOR GFR: 0.89 MG/DL (ref 0.55–1.3)
CREATININE,RANDOM URINE: 174 MG/DL
EOS #: 0.4 10^3/UL (ref 0–0.5)
EOSINOPHIL NFR BLD AUTO: 2.4 % (ref 0–3)
GLUCOSE, FASTING: 89 MG/DL (ref 70–100)
HEMATOCRIT: 26.4 % (ref 36–47)
HEMOGLOBIN: 8.5 G/DL (ref 12–15.5)
IMMATURE GRANULOCYTE %: 0.7 % (ref 0–3)
LACTIC ACID SEPSIS PROTOCOL: 1.6 MMOL/L (ref 0.4–2)
LDH SERPL L TO P-CCNC: 266 U/L (ref 84–246)
LEUKOCYTE ESTERASE UR AUTO RFX: (no result)
LYMPH #: 2.2 10^3/UL (ref 1.5–6.5)
LYMPH %: 13.7 % (ref 24–44)
MEAN CORPUSCULAR HEMOGLOBIN: 29.7 PG (ref 27–33)
MEAN CORPUSCULAR HGB CONC: 32.2 G/DL (ref 32–36.5)
MEAN CORPUSCULAR VOLUME: 92.3 FL (ref 80–96)
MONO #: 0.8 10^3/UL (ref 0–0.8)
MONO %: 4.7 % (ref 0–5)
NEUTROPHILS #: 12.8 10^3/UL (ref 1.8–7.7)
NEUTROPHILS %: 78.2 % (ref 36–66)
NRBC BLD AUTO-RTO: 0.1 % (ref 0–0)
PLATELET COUNT, AUTOMATED: 395 10^3/UL (ref 150–450)
POTASSIUM SERUM: 4.6 MEQ/L (ref 3.5–5.1)
RED BLOOD COUNT: 2.86 10^6/UL (ref 4–5.4)
RED CELL DISTRIBUTION WIDTH: 14.9 % (ref 11.5–14.5)
SODIUM LEVEL: 145 MEQ/L (ref 136–145)
SPECIFIC GRAVITY UR AUTO RFX: 1.02 (ref 1–1.03)
SQUAM EPITHELIAL CELL UR AURFX: 0 /HPF (ref 0–6)
TOTAL PROTEIN,RANDOM URINE: 14.8 MG/DL (ref 0–12)
URIC ACID: 8.1 MG/DL (ref 2.6–6)
WHITE BLOOD COUNT: 16.3 10^3/UL (ref 4–10)

## 2018-08-16 PROCEDURE — 76856 US EXAM PELVIC COMPLETE: CPT

## 2018-08-16 RX ADMIN — ACETAMINOPHEN 1 MG: 325 TABLET ORAL at 20:10

## 2018-08-16 RX ADMIN — IBUPROFEN 1 MG: 800 TABLET, FILM COATED ORAL at 20:10

## 2018-08-16 RX ADMIN — NITROFURANTOIN (MONOHYDRATE/MACROCRYSTALS) 1 MG: 75; 25 CAPSULE ORAL at 23:30

## 2018-08-16 RX ADMIN — SODIUM CHLORIDE 1 MLS/HR: 9 INJECTION, SOLUTION INTRAVENOUS at 20:45

## 2018-08-16 RX ADMIN — SODIUM CHLORIDE 1 MLS/HR: 9 INJECTION, SOLUTION INTRAVENOUS at 20:10

## 2019-10-16 ENCOUNTER — HOSPITAL ENCOUNTER (EMERGENCY)
Dept: HOSPITAL 53 - M ED | Age: 24
LOS: 1 days | Discharge: HOME | End: 2019-10-17
Payer: COMMERCIAL

## 2019-10-16 VITALS — HEIGHT: 66 IN | WEIGHT: 265.55 LBS | BODY MASS INDEX: 42.68 KG/M2

## 2019-10-16 DIAGNOSIS — Z91.040: ICD-10-CM

## 2019-10-16 DIAGNOSIS — K21.9: ICD-10-CM

## 2019-10-16 DIAGNOSIS — N83.202: Primary | ICD-10-CM

## 2019-10-16 DIAGNOSIS — Z91.018: ICD-10-CM

## 2019-10-16 LAB
ALBUMIN SERPL BCG-MCNC: 3.7 GM/DL (ref 3.2–5.2)
ALT SERPL W P-5'-P-CCNC: 24 U/L (ref 12–78)
BASOPHILS # BLD AUTO: 0.1 10^3/UL (ref 0–0.2)
BASOPHILS NFR BLD AUTO: 0.8 % (ref 0–1)
BILIRUB CONJ SERPL-MCNC: 0.1 MG/DL (ref 0–0.2)
BILIRUB SERPL-MCNC: 0.6 MG/DL (ref 0.2–1)
BUN SERPL-MCNC: 14 MG/DL (ref 7–18)
CALCIUM SERPL-MCNC: 8.8 MG/DL (ref 8.5–10.1)
CHLORIDE SERPL-SCNC: 108 MEQ/L (ref 98–107)
CO2 SERPL-SCNC: 26 MEQ/L (ref 21–32)
CREAT SERPL-MCNC: 0.92 MG/DL (ref 0.55–1.3)
EOSINOPHIL # BLD AUTO: 0.3 10^3/UL (ref 0–0.5)
EOSINOPHIL NFR BLD AUTO: 3.9 % (ref 0–3)
GFR SERPL CREATININE-BSD FRML MDRD: > 60 ML/MIN/{1.73_M2} (ref 60–?)
GLUCOSE SERPL-MCNC: 78 MG/DL (ref 70–100)
HCT VFR BLD AUTO: 42.4 % (ref 36–47)
HGB BLD-MCNC: 13.7 G/DL (ref 12–15.5)
LIPASE SERPL-CCNC: 78 U/L (ref 73–393)
LYMPHOCYTES # BLD AUTO: 2.5 10^3/UL (ref 1.5–5)
LYMPHOCYTES NFR BLD AUTO: 35.3 % (ref 24–44)
MCH RBC QN AUTO: 28.4 PG (ref 27–33)
MCHC RBC AUTO-ENTMCNC: 32.3 G/DL (ref 32–36.5)
MCV RBC AUTO: 87.8 FL (ref 80–96)
MONOCYTES # BLD AUTO: 0.5 10^3/UL (ref 0–0.8)
MONOCYTES NFR BLD AUTO: 7.2 % (ref 0–5)
NEUTROPHILS # BLD AUTO: 3.8 10^3/UL (ref 1.5–8.5)
NEUTROPHILS NFR BLD AUTO: 52.5 % (ref 36–66)
PLATELET # BLD AUTO: 319 10^3/UL (ref 150–450)
POTASSIUM SERPL-SCNC: 3.9 MEQ/L (ref 3.5–5.1)
PROT SERPL-MCNC: 7.1 GM/DL (ref 6.4–8.2)
RBC # BLD AUTO: 4.83 10^6/UL (ref 4–5.4)
SODIUM SERPL-SCNC: 138 MEQ/L (ref 136–145)
WBC # BLD AUTO: 7.2 10^3/UL (ref 4–10)

## 2019-10-16 NOTE — REPVR
PROCEDURE INFORMATION: 

Exam: US Pelvis Complete, Transabdominal 

Exam date and time: 10/16/2019 10:48 PM 

Clinical history: 24 years old, female; Pelvic pain; Additional info: Lower abd 

pain, suprapubic pain 



TECHNIQUE: 

Imaging protocol: Real-time transabdominal pelvic ultrasound with image 

documentation. Complete exam. 



COMPARISON: 

US PELVIC NON-OB COMPLETE 8/16/2018 7:47 PM 



FINDINGS: 

Uterus/cervix: The uterus measures 8.7 CM in length x 4.2 CM in AP dimension by 

5.2 CM in transverse dimension and anteverted. There is an IUD within the 

endometrium. 

Right adnexa: The right ovary measures 2.7 CM in length by 1.6 CM in thickness 

and there is vascular flow of the right ovary with no evidence of torsion. 

Left adnexa: The left ovary measures 2.7 CM in length by 1.6 CM in thickness 

and there is vascular flow with no evidence of torsion. There is a small cyst 

at the left ovary. 

Free fluid: There is no evidence of free fluid within the pelvis. 

Bladder: Normal appearing urinary bladder. 



IMPRESSION: 

Small cyst left ovary. No evidence of torsion right or left ovary. 



Electronically signed by: Romero Johnson On 10/16/2019  23:08:51 PM

## 2019-10-17 VITALS — DIASTOLIC BLOOD PRESSURE: 68 MMHG | SYSTOLIC BLOOD PRESSURE: 129 MMHG

## 2020-05-08 NOTE — REP
OB ULTRASOUND:

 

Real-time sonographic evaluation of the gravid uterus performed.

 

There is a single living intrauterine gestation with an estimated gestational age

20 weeks 3 days with EDC 09/22/2020.  Today's measurements indicate appropriate

growth.

 

Biometry and Growth:

 

BPD    47 mm = 20 weeks 1 day, 43rd percentile

 

HC    172 mm = 19 weeks 5 days, 30th percentile

 

AC    152 mm = 20 weeks 3 days, 50th percentile

 

FL       31 mm = 19 weeks 3 days, 26th percentile

 

HC/AC ratio 1.13 within normal range of 1.06 to 1.24.

 

Estimated fetal weight 323 grams 32nd percentile.

 

                                       SEEN/GROSSLY UNREMARKABLE

 

Lateral ventricles                       Yes

 

Posterior fossa                          Yes

 

Upper lip                                    Yes

 

Four-chamber heart                    No

 

LVOT                                         Yes

 

RVOT                                          No

 

Stomach                                    Yes

 

Cord insertion                            Yes

 

Three vessel cord                      Yes

 

Kidneys                                      Yes

 

Bladder                                      Yes

 

Spine                                          No

 

Cervical length:  Closed and measures 3.4 cm in length.

 

Fetal heart rate:  150 beats per minute.

 

Fetal position:  Variable.

 

Placenta:  Anterior and grade 1 with no  previa or abruption.

 

Amniotic fluid:  Within normal limits.

## 2020-06-03 NOTE — REP
REASON FOR EXAM:  Followup four-chamber heart, right ventricular outflow tract,

and fetal spine.

 

COMPARISON:  05/08/2020

 

Multiple ultrasonographic images of the gravid uterus show a single living

intrauterine gestation in variable positions. Doppler interrogation of the fetal

heart shows a heart rate of 143 beats per minute. The placenta is anterior and

not low lying. The subjective amniotic fluid volume is within normal limits. The

cervix measures 3 cm in length and is closed.

 

BPD                      5.7 cm = 23 weeks 4 day

 

HC                      21.2 cm = 23 weeks 2 day

 

AC                      20.2 cm = 24 weeks 6 day

 

FL                         4.6 cm = 25 weeks 2 day

 

The estimated fetal weight is 729 grams, which is at the 63rd percentile for a 24

week 1 day gestational age.

 

Four-chamber heart, ventricular outflow tracts, and fetal spine were well seen

today and appear normal.

 

This completes the fetal anatomical screen.

 

IMPRESSION:

 

Single living intrauterine gestation, as described above, with an estimated

gestational age of 23 weeks 6 days via composite criteria and an estimated date

of delivery of 09/24/2020 by today's exam. No fetal anomalies were detected.

## 2020-08-28 NOTE — REP
OBSTETRIC SONOGRAPHY:



HISTORY:  Supervision of pregnancy. Fetal growth study. 



FINDINGS: 

 Scanning through the gravid uterus demonstrates a viable single intrauterine 
gestation in a cephalic fetal lie. The placenta is anterior grade 1 without 
evidence of previa or abruption. Amniotic fluid is subjectively normal.  Closed 
cervical length is 4.2 cm viewed transabdominally. CELY is normal at 11.5 cm.  



BIOMETRY CHART:                     





BPD 82 mm 33 weeks 1 day

 

Head circumference 298 mm 33 weeks 1 day

 

Abdominal circumference 291 mm 33 weeks 1 day

 

Femur length 63 mm 33 weeks 1 day

 

Humeral length 57 mm 33 weeks 1 day





Estimated fetal weight is 2113 grams, 35th percentile. 



IMPRESSION: 

Viable single intrauterine gestation at 33 weeks 1 day by today's composite 
criteria. There is evidence of appropriate interval growth. 

MTDD
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
## 2020-09-22 NOTE — DNPDOC
[facility] Delivery Note


Delivery Note


DATE OF DELIVERY: 2020


 


TIME OF BIRTH: 1800





GENDER: Male.  





APGARS: 9 and 9.  





WEIGHT:, 3850 grams or 8 pounds 8 ounces.  





LACERATIONS:. None





ANESTHESIA:  None.  





ESTIMATED BLOOD LOSS: 300ml





COUNTS: 5 laparotomy sponges accounted for prior to after delivery.   


   


DELIVERY NOTE: On 2020 at 1800, Ms. Dunlap a 25-year-old  9 now para

2, had a spontaneous vaginal delivery of viable male infant, Apgars, 9 and 9 and

weight 3850 g or 8 lbs. 8 oz.  Head was delivered occiput anterior (OA) , 

followed by delivery of the shoulders and corpus.  Infant was handed to mom with

a good cry.  Cord was clamped times two and was cut by support person under my 

direction.  Placenta was then drained and delivered grossly intact.  A premixed 

bag of 500 mL of normal saline with 30 units of Pitocin was then bolused along 

with uterine massage until the uterus was firm.  On inspection, cervix, vagina, 

perineum was grossly intact and hemostatic.  Mom and baby in recovery on stable 

condition.  


Mom decided to name her  son, Jaiden HARDYRENITA HOBSON MD.                 Sep 22, 2020 18:19

## 2020-09-23 NOTE — IPNPDOC
Postpartum Progress Note


Date of Service:  Sep 23, 2020


Postpartum Day#:  1


Postpartum Progress Note


SUBJECT: Doing well without complaints. Ambulating, voiding and pain is well-c

ontrolled. Reports minimal lochia. 





OBJECTIVE: 


VITAL SIGNS: Within normal limits, afebrile.


Alert and oriented times three.


Abdomen: Fundus firm at U-2. Soft, NTTP.


Ext: neg calf tenderness.


ASSESSMENT: Postpartum day #1 status post normal spontaneous vaginal delivery. 

Recovering in stable condition.





PLAN:


1. Continue routine postpartum care


2. Discharge plans for tomorrow





VS, I&O, 24H, Fishbone


Vital Signs/I&O





Vital Signs








  Date Time  Temp Pulse Resp B/P (MAP) Pulse Ox O2 Delivery O2 Flow Rate FiO2


 


9/23/20 06:00 98.1 73 17 136/78 (97) 96 Room Air  














I&O- Last 24 Hours up to 6 AM 


 


 9/23/20





 05:59


 


Intake Total 680 ml


 


Output Total 700 ml


 


Balance -20 ml











Laboratory Data


24H LABS


Laboratory Tests 2


9/22/20 17:26: Serology Scanned Report Hepatitis B Testing


9/22/20 18:57: Syphilis Serology NONREACTIVE











RENITA MARSHALL MD.                 Sep 23, 2020 07:41